# Patient Record
Sex: MALE | Race: WHITE | Employment: FULL TIME | URBAN - NONMETROPOLITAN AREA
[De-identification: names, ages, dates, MRNs, and addresses within clinical notes are randomized per-mention and may not be internally consistent; named-entity substitution may affect disease eponyms.]

---

## 2020-01-28 ENCOUNTER — HOSPITAL ENCOUNTER (EMERGENCY)
Age: 30
Discharge: HOME | End: 2020-01-28
Attending: EMERGENCY MEDICINE
Payer: COMMERCIAL

## 2020-01-28 VITALS
OXYGEN SATURATION: 98 % | TEMPERATURE: 97.2 F | RESPIRATION RATE: 16 BRPM | HEIGHT: 68 IN | HEART RATE: 77 BPM | WEIGHT: 218 LBS | BODY MASS INDEX: 33.04 KG/M2 | SYSTOLIC BLOOD PRESSURE: 119 MMHG | DIASTOLIC BLOOD PRESSURE: 68 MMHG

## 2020-01-28 PROCEDURE — 99202 OFFICE O/P NEW SF 15 MIN: CPT

## 2020-01-28 PROCEDURE — 99203 OFFICE O/P NEW LOW 30 MIN: CPT | Performed by: EMERGENCY MEDICINE

## 2020-01-28 RX ORDER — CETIRIZINE HYDROCHLORIDE, PSEUDOEPHEDRINE HYDROCHLORIDE 5; 120 MG/1; MG/1
1 TABLET, FILM COATED, EXTENDED RELEASE ORAL 2 TIMES DAILY
Qty: 30 TABLET | Refills: 0 | Status: SHIPPED | OUTPATIENT
Start: 2020-01-28 | End: 2020-02-12

## 2020-01-28 RX ORDER — CEFDINIR 300 MG/1
300 CAPSULE ORAL 2 TIMES DAILY
Qty: 20 CAPSULE | Refills: 0 | Status: SHIPPED | OUTPATIENT
Start: 2020-01-28 | End: 2020-02-07

## 2020-01-28 RX ORDER — IBUPROFEN 600 MG/1
600 TABLET ORAL EVERY 6 HOURS PRN
Qty: 15 TABLET | Refills: 0 | Status: SHIPPED | OUTPATIENT
Start: 2020-01-28

## 2020-01-28 ASSESSMENT — ENCOUNTER SYMPTOMS
EYE REDNESS: 0
WHEEZING: 0
RHINORRHEA: 1
ABDOMINAL PAIN: 0
SINUS PRESSURE: 1
SHORTNESS OF BREATH: 0
BACK PAIN: 0
EYE PAIN: 0
EYE DISCHARGE: 0
TROUBLE SWALLOWING: 0
VOMITING: 0
NAUSEA: 0
SORE THROAT: 1
DIARRHEA: 0
COUGH: 0
VOICE CHANGE: 1
STRIDOR: 0

## 2020-01-28 ASSESSMENT — PAIN SCALES - GENERAL: PAINLEVEL_OUTOF10: 5

## 2020-01-28 ASSESSMENT — PAIN DESCRIPTION - LOCATION: LOCATION: EAR

## 2020-01-28 NOTE — ED PROVIDER NOTES
[id number]       Chief Complaint   Patient presents with    Otalgia     l    Sinusitis       Nurses Notes reviewed and I agree except as noted in the HPI. HISTORY OF PRESENT ILLNESS   Yolanda Tinoco is a 34 y.o. male who presents with 24-hour history of sinus congestion, nasal voice, postnasal drainage, sore throat and increasingly severe ear pain, left more than right, that he currently rates at 5 out of 10 in severity. Family members at home with upper respiratory infections. No chest pain, shortness of breath, abdominal pain, vomiting. History of diabetes mellitus. No asthma or heart disease. Non-smoker. No renal insufficiency. S/P tonsillectomy  REVIEW OF SYSTEMS     Review of Systems   Constitutional: Positive for appetite change, chills and fatigue. Negative for fever and unexpected weight change. HENT: Positive for congestion, ear pain, postnasal drip, rhinorrhea, sinus pressure, sneezing, sore throat and voice change. Negative for ear discharge and trouble swallowing. Eyes: Negative for pain, discharge and redness. Respiratory: Negative for cough, shortness of breath, wheezing and stridor. Cardiovascular: Negative for chest pain and leg swelling. Gastrointestinal: Negative for abdominal pain, diarrhea, nausea and vomiting. Genitourinary: Negative for dysuria, frequency, hematuria and urgency. Musculoskeletal: Negative for arthralgias, back pain, myalgias and neck pain. Skin: Negative for rash. Neurological: Negative for dizziness, syncope, weakness and headaches. Hematological: Negative for adenopathy. Psychiatric/Behavioral: Negative for behavioral problems, confusion, sleep disturbance and suicidal ideas. The patient is not nervous/anxious. All other systems reviewed and are negative.       PAST MEDICAL HISTORY         Diagnosis Date    Diabetes mellitus ([facility] [address]        SURGICAL HISTORY     Patient  has a past surgical history that includes hernia repair; Dental surgery; and Tonsillectomy. CURRENT MEDICATIONS       Discharge Medication List as of 2020  6:01 PM      CONTINUE these medications which have NOT CHANGED    Details   insulin regular (HUMULIN R;NOVOLIN R) 100 UNIT/ML injection Inject into the skin See [address]     Patient is has No Known Allergies. FAMILY HISTORY     Patient'sfamily history is not on file. SOCIAL HISTORY     Patient  reports that he has never smoked. He has never used smokeless tobacco. He reports previous alcohol use. He reports previous drug use. PHYSICAL EXAM     ED TRIAGE VITALS  BP: 119/68, Temp: 97.2 °F (36.2 °C), Pulse: 77, Resp: 16, SpO2: 98 %  Physical Exam  Vitals signs and nursing note reviewed. Constitutional:       Appearance: He is well-developed. Comments: Nasal voice, dry cough, moist membranes   HENT:      Head: Normocephalic and atraumatic. Right Ear: External ear normal. Tympanic membrane is erythematous and retracted. Left Ear: External ear normal. Tympanic membrane is erythematous and retracted. Nose: Congestion and rhinorrhea present. Mouth/Throat:      Pharynx: Posterior oropharyngeal erythema present. No oropharyngeal exudate. Tonsils: Tonsillar exudate present. Swellin on the right. 0 on the left. Comments: Erythematous pharynx no abscess  Eyes:      General: No scleral icterus. Right eye: No discharge. Left eye: No discharge. Conjunctiva/sclera: Conjunctivae normal.      Pupils: Pupils are equal, round, and reactive to light. Comments: Conjunctiva clear   Neck:      Musculoskeletal: Normal range of motion. Thyroid: No thyromegaly. Vascular: No JVD. Comments: No meningismus  Cardiovascular:      Rate and Rhythm: Normal rate and regular rhythm. Pulses: Normal pulses.       Heart sounds: Normal heart sounds, S1 normal and airway abscess or epiglottitis, sepsis, CNS infection, pneumonia, hypoxia, bronchospasm. No ACS, CHF, PE. Patient has bilateral otitis media, acute pharyngitis and acute sinusitis. Will treat with Omnicef, Zyrtec-D, Motrin, Tylenol, vaporizer, rest, increased oral clear liquids. Patient understands to follow-up with PCP in 6 days, and he was given PCP referral number per his request.  He understands to go to ED if worse.   PATIENT REFERRED TO:  Recheck with primary care physician    Schedule an appointment as soon as possible for a visit in 6 days  Recheck with primary care physician, go to emergency if worse    DISCHARGE MEDICATIONS:  Discharge Medication List as of 1/28/2020  6:01 PM      START taking these medications    Details   cefdinir (OMNICEF) 300 MG capsule Take 1 capsule by mouth 2 times daily for 10 days, Disp-20 capsule, R-0Print      cetirizine-psuedoephedrine (ZYRTEC-D) 5-120 MG per extended release tablet Take 1 tablet by mouth 2 times daily for 30 doses 1 p.o. twice daily for sinus pain and pressure, congestion, postnasal drainage, ear pain and pressure, cough, Disp-30 tablet, R-0Print      ibuprofen (IBU) 600 MG tablet Take 1 tablet by mouth every 6 hours as needed for Pain or Fever (1 p.o. 3 times daily with food for pain or fever), Disp-15 tablet, R-0Print           Discharge Medication List as of 1/28/2020  6:01 PM          MD Jayson Irwin MD  01/28/20 3427

## 2020-01-28 NOTE — ED NOTES
To [facility] with complaints of sinus drainage that started today then developed left ear pain. Wife was here today and dx with ear infection and daughter was seen Sunday and dx with ear infection.       Aminata Morgan RN  01/28/20 [id number] [facility], RN  01/28/20 [id number]

## 2021-11-28 ENCOUNTER — HOSPITAL ENCOUNTER (EMERGENCY)
Age: 31
Discharge: HOME | End: 2021-11-28
Payer: COMMERCIAL

## 2021-11-28 VITALS
WEIGHT: 234 LBS | SYSTOLIC BLOOD PRESSURE: 138 MMHG | HEART RATE: 93 BPM | OXYGEN SATURATION: 97 % | TEMPERATURE: 98 F | RESPIRATION RATE: 18 BRPM | DIASTOLIC BLOOD PRESSURE: 90 MMHG | BODY MASS INDEX: 35.46 KG/M2 | HEIGHT: 68 IN

## 2021-11-28 DIAGNOSIS — J01.10 ACUTE NON-RECURRENT FRONTAL SINUSITIS: ICD-10-CM

## 2021-11-28 DIAGNOSIS — J02.9 ACUTE PHARYNGITIS, UNSPECIFIED ETIOLOGY: Primary | ICD-10-CM

## 2021-11-28 LAB
GROUP A STREP CULTURE, REFLEX: NEGATIVE
REFLEX THROAT C + S: NORMAL
SARS-COV-2, NAA: NOT  DETECTED

## 2021-11-28 PROCEDURE — 99213 OFFICE O/P EST LOW 20 MIN: CPT

## 2021-11-28 PROCEDURE — 87635 SARS-COV-2 COVID-19 AMP PRB: CPT

## 2021-11-28 PROCEDURE — 87070 CULTURE OTHR SPECIMN AEROBIC: CPT

## 2021-11-28 PROCEDURE — 87880 STREP A ASSAY W/OPTIC: CPT

## 2021-11-28 PROCEDURE — 99213 OFFICE O/P EST LOW 20 MIN: CPT | Performed by: NURSE PRACTITIONER

## 2021-11-28 RX ORDER — METHYLPREDNISOLONE 4 MG/1
TABLET ORAL
Qty: 1 KIT | Refills: 0 | Status: SHIPPED | OUTPATIENT
Start: 2021-11-28 | End: 2021-12-04

## 2021-11-28 RX ORDER — AZITHROMYCIN 250 MG/1
250 TABLET, FILM COATED ORAL SEE ADMIN INSTRUCTIONS
Qty: 6 TABLET | Refills: 0 | Status: SHIPPED | OUTPATIENT
Start: 2021-11-28 | End: 2021-12-03

## 2021-11-28 RX ORDER — DEXTROMETHORPHAN HYDROBROMIDE AND PROMETHAZINE HYDROCHLORIDE 15; 6.25 MG/5ML; MG/5ML
5 SYRUP ORAL 4 TIMES DAILY PRN
Qty: 150 ML | Refills: 0 | Status: SHIPPED | OUTPATIENT
Start: 2021-11-28 | End: 2021-12-05

## 2021-11-28 ASSESSMENT — ENCOUNTER SYMPTOMS
RHINORRHEA: 1
SHORTNESS OF BREATH: 1
SINUS PAIN: 1
SINUS PRESSURE: 1
COUGH: 1
GASTROINTESTINAL NEGATIVE: 1

## 2021-11-28 ASSESSMENT — PAIN SCALES - GENERAL: PAINLEVEL_OUTOF10: 3

## 2021-11-28 ASSESSMENT — PAIN DESCRIPTION - ORIENTATION: ORIENTATION: LEFT

## 2021-11-28 ASSESSMENT — PAIN DESCRIPTION - PAIN TYPE: TYPE: ACUTE PAIN

## 2021-11-28 ASSESSMENT — PAIN DESCRIPTION - FREQUENCY: FREQUENCY: CONTINUOUS

## 2021-11-28 ASSESSMENT — PAIN DESCRIPTION - LOCATION: LOCATION: EAR

## 2021-11-28 ASSESSMENT — PAIN DESCRIPTION - DESCRIPTORS: DESCRIPTORS: ACHING

## 2021-11-28 NOTE — Clinical Note
Dwaine Sanders was seen and treated in our emergency department on 11/28/2021. He may return to work on 11/30/2021. If you have any questions or concerns, please don't hesitate to call.       Jacqueline No, TRESA - CNP

## 2021-11-28 NOTE — ED PROVIDER NOTES
[facility] Encounter      [facility]       Chief Complaint   Patient presents with    Sinusitis    Pharyngitis       Nurses Notes reviewed and I agree except as noted in the HPI. HISTORY OF PRESENT ILLNESS   Justin Torrez is a 36 y.o. male who presents The history is provided by the patient. Pharyngitis  Location:  Generalized  Quality:  Aching, burning, sharp and sore  Severity:  Moderate  Onset quality:  Sudden  Duration:  2 days  Timing:  Constant  Progression:  Worsening  Chronicity:  New  Relieved by:  Nothing  Worsened by:  Drinking and eating  Ineffective treatments:  NSAIDs and acetaminophen  Associated symptoms: adenopathy, cough, ear pain, fever, headaches, rhinorrhea, shortness of breath, sinus congestion, trouble swallowing and voice change    Cough:     Cough characteristics:  Non-productive, croupy, dry and hacking    Sputum characteristics:  Nondescript    Severity:  Mild    Onset quality:  Sudden    Duration:  2 days    Timing:  Intermittent    Progression:  Worsening    Chronicity:  New  Rhinorrhea:     Quality:  Clear and yellow    Severity:  Moderate    Duration:  2 days    Timing:  Intermittent    Progression:  Worsening  Risk factors: sick contacts          REVIEW OF SYSTEMS     Review of Systems   Constitutional: Positive for activity change, appetite change and fever. HENT: Positive for congestion, ear pain, rhinorrhea, sinus pressure, sinus pain, trouble swallowing and voice change. Respiratory: Positive for cough and shortness of breath. Cardiovascular: Negative for leg swelling. Gastrointestinal: Negative. Endocrine: Negative for cold intolerance and heat intolerance. Musculoskeletal: Positive for arthralgias and myalgias. Allergic/Immunologic: Positive for environmental allergies. Neurological: Positive for headaches. Hematological: Positive for adenopathy.        PAST MEDICAL HISTORY         Diagnosis Date    Diabetes mellitus [facility])        SURGICAL HISTORY     Patient  has a past surgical history that includes hernia repair; Dental surgery; and Tonsillectomy. CURRENT MEDICATIONS       Discharge Medication List as of 11/28/2021 12:05 PM      CONTINUE these medications which have NOT CHANGED    Details   insulin regular (HUMULIN R;NOVOLIN R) 100 UNIT/ML injection Inject into the skin See Admin InstructionsHistorical Med      ibuprofen (IBU) 600 MG tablet Take 1 tablet by mouth every 6 hours as needed for Pain or Fever (1 p.o. 3 times daily with food for pain or fever), Disp-15 tablet, R-0Print             ALLERGIES     Patient is has No Known Allergies. FAMILY HISTORY     Patient'sfamily history is not on file. SOCIAL HISTORY     Patient  reports that he has never smoked. He has never used smokeless tobacco. He reports previous alcohol use. He reports previous drug use. PHYSICAL EXAM     ED TRIAGE VITALS  BP: (!) 138/90, Temp: 98 °F (36.7 °C), Pulse: 93, Resp: 18, SpO2: 97 %  Physical Exam  Vitals and nursing note reviewed. Constitutional:       Appearance: Normal appearance. He is well-developed and normal weight. He is ill-appearing. HENT:      Head: Normocephalic and atraumatic. Right Ear: External ear normal. Tenderness present. Tympanic membrane is erythematous. Left Ear: External ear normal. Tenderness present. Tympanic membrane is erythematous. Nose: Congestion and rhinorrhea ( clear) present. Mouth/Throat:      Mouth: Mucous membranes are moist.      Pharynx: Pharyngeal swelling and posterior oropharyngeal erythema present. No oropharyngeal exudate. Tonsils: No tonsillar exudate. Eyes:      General:         Right eye: No discharge. Left eye: No discharge. Conjunctiva/sclera: Conjunctivae normal.   Neck:      Thyroid: No thyromegaly. Cardiovascular:      Rate and Rhythm: Normal rate and regular rhythm. Pulses: Normal pulses.       Heart sounds: Normal heart sounds. Pulmonary:      Effort: Pulmonary effort is normal. No respiratory distress. Breath sounds: Normal breath sounds. No wheezing or rales. Chest:      Chest wall: No tenderness. Abdominal:      General: Bowel sounds are normal. There is no distension. Palpations: Abdomen is soft. Tenderness: There is no abdominal tenderness. Musculoskeletal:         General: No tenderness. Normal range of motion. Cervical back: Normal range of motion and neck supple. No muscular tenderness. Lymphadenopathy:      Cervical: No cervical adenopathy. Skin:     General: Skin is warm and dry. Capillary Refill: Capillary refill takes less than 2 seconds. Coloration: Skin is not pale. Findings: No erythema or rash. Neurological:      General: No focal deficit present. Mental Status: He is alert and oriented to person, place, and time. Mental status is at baseline. Cranial Nerves: No cranial nerve deficit. Motor: No abnormal muscle tone. Coordination: Coordination normal.      Deep Tendon Reflexes: Reflexes are normal and symmetric. Reflexes normal.   Psychiatric:         Behavior: Behavior normal.         Thought Content:  Thought content normal.         Judgment: Judgment normal.         DIAGNOSTIC RESULTS   Labs:   Results for orders placed or performed during the hospital encounter of 11/28/21   Culture, Throat    Specimen: Throat   Result Value Ref Range    Throat/Nose Culture Normal shauna- preliminary     Strep A culture, throat   Result Value Ref Range    REFLEX THROAT C + S INDICATED    COVID-19, Rapid   Result Value Ref Range    SARS-CoV-2, JOHN NOT  DETECTED NOT DETECTED   STREP A ANTIGEN   Result Value Ref Range    GROUP A STREP CULTURE, REFLEX Negative        IMAGING:  No orders to display     URGENT CARE COURSE:     Vitals:    11/28/21 1152   BP: (!) 138/90   Pulse: 93   Resp: 18   Temp: 98 °F (36.7 °C)   TempSrc: Tympanic   SpO2: 97%   Weight: 234 lb (106.1 kg)   Height: 5' 8\" (1.727 m)       Medications - No data to display  PROCEDURES:  None  FINALIMPRESSION    I have reviewed the patient's medical history in detail and updated the computerized patient record. HPI/ROS per the patient and caregiver. Overall non toxic in appearance. Answers questions appropriately. Conditions discussed and addressed this visit include:     Pt with acute URI symptoms x 2 days. Has not responded to otc measures. Does appear acutely ill. Sinus pain and congestion with boggy erythematous mucosal surfaces. Covid and strep are negative. Did discuss with pt he may consider retesting in 2 days if symptoms persist. Patient is to take medications as directed. Continue all home medications. Potential side effects of the medications were reviewed with the patient in detail. The patient can increase fluids. The patient can have Tylenol or Motrin for pain and fever as needed. Discussed with patient signs and symptoms that would require emergent evaluation and treatment. The patient will follow-up with their family physician or go to the ER if they develop any worsening symptoms. The patient was discharged in stable condition      1. Acute pharyngitis, unspecified etiology    2.  Acute non-recurrent frontal sinusitis        DISPOSITION/PLAN   DISPOSITION Decision To Discharge 11/28/2021 12:05:25 PM    PATIENT REFERRED TO:  [address] [place] [address] Urgent Care  [id number] [facility]  [phone]  In 3 days  As needed, If symptoms worsen    DISCHARGE MEDICATIONS:  Discharge Medication List as of 11/28/2021 12:05 PM      START taking these medications    Details   azithromycin (ZITHROMAX) 250 MG tablet Take 1 tablet by mouth See Admin Instructions for 5 days 500mg on day 1 followed by 250mg on days 2 - 5, Disp-6 tablet, R-0Normal      methylPREDNISolone (MEDROL, CHRISTO,) 4 MG tablet Take by mouth., Disp-1 kit, R-0Normal      promethazine-dextromethorphan (PROMETHAZINE-DM) 6.25-15 MG/5ML syrup Take 5 mLs by mouth 4 times daily as needed for Cough, Disp-150 mL, R-0Normal           Discharge Medication List as of 11/28/2021 12:05 PM          TRESA Fung CNP, APRN - CNP  11/29/21 3594

## 2021-11-29 ASSESSMENT — ENCOUNTER SYMPTOMS
VOICE CHANGE: 1
TROUBLE SWALLOWING: 1
SINUS CONGESTION: 1

## 2021-11-30 LAB — THROAT/NOSE CULTURE: NORMAL

## 2022-05-04 ENCOUNTER — HOSPITAL ENCOUNTER (EMERGENCY)
Age: 32
Discharge: HOME | End: 2022-05-04
Attending: EMERGENCY MEDICINE
Payer: COMMERCIAL

## 2022-05-04 VITALS
RESPIRATION RATE: 16 BRPM | HEART RATE: 92 BPM | BODY MASS INDEX: 37.13 KG/M2 | OXYGEN SATURATION: 97 % | WEIGHT: 245 LBS | TEMPERATURE: 98 F | SYSTOLIC BLOOD PRESSURE: 145 MMHG | DIASTOLIC BLOOD PRESSURE: 81 MMHG | HEIGHT: 68 IN

## 2022-05-04 DIAGNOSIS — T78.40XA ACUTE ALLERGIC REACTION, INITIAL ENCOUNTER: Primary | ICD-10-CM

## 2022-05-04 DIAGNOSIS — E10.9 TYPE 1 DIABETES MELLITUS WITHOUT COMPLICATION (HCC): ICD-10-CM

## 2022-05-04 DIAGNOSIS — R60.0 EDEMA OF FACE: ICD-10-CM

## 2022-05-04 PROCEDURE — 99213 OFFICE O/P EST LOW 20 MIN: CPT | Performed by: EMERGENCY MEDICINE

## 2022-05-04 PROCEDURE — 99213 OFFICE O/P EST LOW 20 MIN: CPT

## 2022-05-04 RX ORDER — ACETAMINOPHEN 325 MG/1
650 TABLET ORAL EVERY 6 HOURS PRN
COMMUNITY

## 2022-05-04 RX ORDER — HYDROXYZINE 50 MG/1
50 TABLET, FILM COATED ORAL 4 TIMES DAILY PRN
Qty: 15 TABLET | Refills: 0 | Status: SHIPPED | OUTPATIENT
Start: 2022-05-04

## 2022-05-04 RX ORDER — CETIRIZINE HYDROCHLORIDE 10 MG/1
10 TABLET ORAL DAILY
Qty: 30 TABLET | Refills: 0 | Status: SHIPPED | OUTPATIENT
Start: 2022-05-04 | End: 2022-06-03

## 2022-05-04 ASSESSMENT — ENCOUNTER SYMPTOMS
EYE PAIN: 0
SINUS PRESSURE: 0
FACIAL SWELLING: 1
EYE DISCHARGE: 0
DIARRHEA: 0
TROUBLE SWALLOWING: 0
EYE REDNESS: 0
VOMITING: 0
STRIDOR: 0
VOICE CHANGE: 0
NAUSEA: 0
WHEEZING: 0
SORE THROAT: 0
COUGH: 0
ABDOMINAL PAIN: 0
SHORTNESS OF BREATH: 0
BACK PAIN: 0

## 2022-05-04 ASSESSMENT — PAIN DESCRIPTION - LOCATION: LOCATION: BACK;NECK

## 2022-05-04 ASSESSMENT — PAIN DESCRIPTION - DESCRIPTORS: DESCRIPTORS: DISCOMFORT

## 2022-05-04 ASSESSMENT — PAIN DESCRIPTION - ONSET: ONSET: AWAKENED FROM SLEEP

## 2022-05-04 ASSESSMENT — PAIN DESCRIPTION - PAIN TYPE: TYPE: ACUTE PAIN

## 2022-05-04 ASSESSMENT — PAIN DESCRIPTION - FREQUENCY: FREQUENCY: CONTINUOUS

## 2022-05-04 ASSESSMENT — PAIN DESCRIPTION - ORIENTATION: ORIENTATION: RIGHT

## 2022-05-04 ASSESSMENT — PAIN - FUNCTIONAL ASSESSMENT: PAIN_FUNCTIONAL_ASSESSMENT: 0-10

## 2022-05-04 NOTE — ED TRIAGE NOTES
Pt presents to [facility] with c/o right sided facial and oral swelling that started approx 2am today. Pt states he has been experiencing back pain from the neck to the lower back and right leg that started yesterday morning and is not sure if the two are related.

## 2022-05-04 NOTE — ED PROVIDER NOTES
[phone]       Chief Complaint   Patient presents with    Facial Swelling     right side, tongue swelling     Oral Swelling    Back Pain     neck, mid back, and lower back       Nurses Notes reviewed and I agree except as noted in the HPI. HISTORY OF PRESENT ILLNESS   Larissa Diane is a 36 y.o. male who presents with 12-hour history of right facial swelling and tongue swelling, with itch to the right facial area. Patient had 48 hours of muscle aches of neck and back after lifting, and took ibuprofen prior to the onset of the facial symptoms. Right side of face slightly erythematous but no other rash. No new cosmetics, detergents, environmental exposures. No insect sting or bite. No fever, vomiting, stridor, joint pain or swelling, chest pain, shortness of breath, wheezing, cough, dizziness or syncope. Diabetes mellitus well controlled with blood sugars in the 160 range. No other medications including ACE inhibitors. Non-smoker  REVIEW OF SYSTEMS     Review of Systems   Constitutional: Negative for appetite change, chills, fatigue, fever and unexpected weight change. Normal appetite no fever   HENT: Positive for facial swelling. Negative for congestion, ear discharge, ear pain, sinus pressure, sneezing, sore throat, trouble swallowing and voice change. Facial and tongue swelling no stridor or airway tightness. Right facial redness and itching   Eyes: Negative for pain, discharge and redness. No redness or drainage   Respiratory: Negative for cough, shortness of breath, wheezing and stridor. No cough or shortness of breath   Cardiovascular: Negative for chest pain and leg swelling. No chest pain or syncope   Gastrointestinal: Negative for abdominal pain, diarrhea, nausea and vomiting. No Abdominal pain vomiting   Genitourinary: Negative for dysuria, frequency, hematuria and urgency.         No  symptoms   Musculoskeletal: Positive for myalgias. Negative for arthralgias, back pain and neck pain. Muscle aches of neck and back after lifting   Skin: Negative for rash. Right facial redness no other rash   Neurological: Negative for dizziness, syncope, weakness and headaches. No headache   Hematological: Negative for adenopathy. Psychiatric/Behavioral: Negative for behavioral problems, confusion, sleep disturbance and suicidal ideas. The patient is not nervous/anxious. red and bold elements reviewed    PAST MEDICAL HISTORY         Diagnosis Date    Diabetes mellitus ([facility] [address]        SURGICAL HISTORY     Patient  has a past surgical history that includes hernia repair; Dental surgery; and Tonsillectomy. CURRENT MEDICATIONS       Discharge Medication List as of 5/4/2022 11:20 AM      CONTINUE these medications which have NOT CHANGED    Details   Fexofenadine HCl (ALLEGRA ALLERGY PO) Take by mouthHistorical Med      acetaminophen (TYLENOL) 325 MG tablet Take 650 mg by mouth every 6 hours as needed for PainHistorical Med      insulin regular (HUMULIN R;NOVOLIN R) 100 UNIT/ML injection Inject into the skin See Admin InstructionsHistorical Med      ibuprofen (IBU) 600 MG tablet Take 1 tablet by mouth every 6 hours as needed for Pain or Fever (1 p.o. 3 times daily with food for pain or fever), Disp-15 tablet, R-0Print             ALLERGIES     Patient is has No Known Allergies. FAMILY HISTORY     Patient'sfamily history is not on file. SOCIAL HISTORY     Patient  reports that he has never smoked. He has never used smokeless tobacco. He reports previous alcohol use. He reports previous drug use. PHYSICAL EXAM     ED TRIAGE VITALS  BP: (!) 145/81, Temp: 98 °F (36.7 °C), Pulse: 92, Resp: 16, SpO2: 97 %  Physical Exam  Vitals and nursing note reviewed. Constitutional:       General: He is not in acute distress. Appearance: He is well-developed. He is not ill-appearing. Comments: moist membranes, normal airway, no stridor   HENT:      Head: Normocephalic and atraumatic. Right Ear: Tympanic membrane and external ear normal.      Left Ear: Tympanic membrane and external ear normal.      Nose: Nose normal.      Mouth/Throat:      Pharynx: No oropharyngeal exudate or posterior oropharyngeal erythema. Tonsils: No tonsillar exudate. 2+ on the right. 2+ on the left. Comments: Tongue appears normal but patient states the tongue feels swollen. No evidence of airway compromise or angioedema of the oral cavity. No evidence of infection of dental structures  Eyes:      General: No scleral icterus. Right eye: No discharge. Left eye: No discharge. Extraocular Movements:      Right eye: Normal extraocular motion. Left eye: Normal extraocular motion. Conjunctiva/sclera: Conjunctivae normal.      Pupils: Pupils are equal, round, and reactive to light. Comments: Conjunctiva clear   Neck:      Thyroid: No thyromegaly. Vascular: No JVD. Comments: No meningismus  Cardiovascular:      Rate and Rhythm: Normal rate and regular rhythm. Pulses: Normal pulses. Heart sounds: Normal heart sounds, S1 normal and S2 normal. No murmur heard. No friction rub. No gallop. Comments: No murmur  Pulmonary:      Effort: Pulmonary effort is normal. No tachypnea or respiratory distress. Breath sounds: Normal breath sounds. No stridor. No decreased breath sounds, wheezing, rhonchi or rales. Comments: No cough lungs clear throughout  Chest:      Chest wall: No tenderness. Abdominal:      General: Bowel sounds are normal. There is no distension. Palpations: Abdomen is soft. There is no mass. Tenderness: There is no abdominal tenderness. There is no right CVA tenderness, left CVA tenderness, guarding or rebound. Comments: Soft nontender   Musculoskeletal:         General: No tenderness. Normal range of motion. Cervical back: Normal range of motion. No spinous process tenderness or muscular tenderness. Comments: Joints and extremities normal   Lymphadenopathy:      Cervical: No cervical adenopathy. Right cervical: No superficial cervical adenopathy. Left cervical: No superficial cervical adenopathy. Skin:     General: Skin is warm and dry. Findings: No erythema or rash. Comments: Erythema right face no other rash or cutaneous normalities   Neurological:      Mental Status: He is alert and oriented to person, place, and time. Cranial Nerves: No cranial nerve deficit. Motor: No abnormal muscle tone. Coordination: Coordination normal.      Deep Tendon Reflexes: Reflexes are normal and symmetric. Reflexes normal.      Comments: Appropriate no focal findings   Psychiatric:         Behavior: Behavior normal.         Thought Content: Thought content normal.         Judgment: Judgment normal.         DIAGNOSTIC RESULTS   Labs: No results found for this visit on 05/04/22. IMAGING:  No orders to display     URGENT CARE COURSE:     Vitals:    05/04/22 1102   BP: (!) 145/81   Pulse: 92   Resp: 16   Temp: 98 °F (36.7 °C)   TempSrc: Temporal   SpO2: 97%   Weight: 245 lb (111.1 kg)   Height: 5' 8\" (1.727 m)       Medications - No data to display  PROCEDURES:  None  FINALIMPRESSION      1. Acute allergic reaction, initial encounter    2. Edema of face    3. [facility]        DISPOSITION/PLAN   DISPOSITION Decision To Discharge 05/04/2022 11:17:12 AM  Nontoxic, well-hydrated, normal airway. No anaphylactic or anaphylactoid reaction, SJS, TEN, angioedema, urticaria. No cardiopulmonary symptoms or signs. No airway compromise. Patient has right facial swelling that does not appear to be infectious, but most likely an allergic phenomenon. Possibly an allergic reaction to NSAIDs, or environmental agents. Will treat with Atarax, Zyrtec, Allegra.   Patient to only use Tylenol for fever and pain, and avoid NSAIDs. Prednisone will be withheld as it previously caused severe hyperglycemia. Patient to recheck with PCP in 5 days, and he clearly understands to go to ED if worse.   PATIENT REFERRED TO:  Recheck with your doctor    In 5 days  Recheck with your doctor, go to emergency if worse    DISCHARGE MEDICATIONS:  Discharge Medication List as of 5/4/2022 11:20 AM      START taking these medications    Details   hydrOXYzine (ATARAX) 50 MG tablet Take 1 tablet by mouth 4 times daily as needed for Itching (redness swelling or rash) Caution not at work will cause drowsiness, Disp-15 tablet, R-0Print      cetirizine (ZYRTEC) 10 MG tablet Take 1 tablet by mouth daily for 30 doses, Disp-30 tablet, R-0Print           Discharge Medication List as of 5/4/2022 11:20 AM          MD Jes Rivera MD  05/04/22 MD Marizol  05/04/22 1145

## 2022-07-27 ENCOUNTER — HOSPITAL ENCOUNTER (EMERGENCY)
Age: 32
Discharge: HOME | End: 2022-07-27
Payer: COMMERCIAL

## 2022-07-27 VITALS
TEMPERATURE: 97.9 F | BODY MASS INDEX: 36.37 KG/M2 | WEIGHT: 240 LBS | DIASTOLIC BLOOD PRESSURE: 80 MMHG | HEIGHT: 68 IN | OXYGEN SATURATION: 97 % | HEART RATE: 82 BPM | RESPIRATION RATE: 16 BRPM | SYSTOLIC BLOOD PRESSURE: 116 MMHG

## 2022-07-27 DIAGNOSIS — U07.1 COVID-19 VIRUS INFECTION: Primary | ICD-10-CM

## 2022-07-27 LAB — SARS-COV-2, NAA: DETECTED

## 2022-07-27 PROCEDURE — 99213 OFFICE O/P EST LOW 20 MIN: CPT

## 2022-07-27 PROCEDURE — 99213 OFFICE O/P EST LOW 20 MIN: CPT | Performed by: NURSE PRACTITIONER

## 2022-07-27 PROCEDURE — 87635 SARS-COV-2 COVID-19 AMP PRB: CPT

## 2022-07-27 ASSESSMENT — ENCOUNTER SYMPTOMS
CHEST TIGHTNESS: 0
NAUSEA: 0
EYE REDNESS: 0
EYE DISCHARGE: 0
DIARRHEA: 0
SINUS PAIN: 0
VOMITING: 0
RHINORRHEA: 1
SHORTNESS OF BREATH: 0
COUGH: 1
TROUBLE SWALLOWING: 0
WHEEZING: 0
SORE THROAT: 1
SINUS PRESSURE: 0

## 2022-07-27 ASSESSMENT — PAIN - FUNCTIONAL ASSESSMENT: PAIN_FUNCTIONAL_ASSESSMENT: NONE - DENIES PAIN

## 2022-07-27 NOTE — ED NOTES
Pt presents to [facility] with family for concern of covid. Pt states his wife took an at home covid test and received a positive result. Pt states he has not been felling well since Sunday.      Patricia Germain, DANITZA  63/54/71 [phone]

## 2022-07-27 NOTE — ED PROVIDER NOTES
[address] [facility] Encounter      [address] [facility]       Chief Complaint   Patient presents with    Concern For COVID-19     Wife is postive       Nurses Notes reviewed and I agree except as noted in the HPI. HISTORY OF PRESENT ILLNESS   Park Mcgee is a 29 y.o. male who presents for evaluation of cough and sore throat. Onset of symptoms in late Sunday/early Monday. Cough is intermittent, dry. Associated sinus congestion/sore throat. Sore throat with coughing/swallowing. No fever, wheezing, shortness of breath. No chest pain. Patient exposed to COVID-19. Requesting COVID-19 testing for work. Symptoms improving with current treatment. REVIEW OF SYSTEMS     Review of Systems   Constitutional:  Negative for chills, diaphoresis, fatigue and fever. HENT:  Positive for congestion, postnasal drip, rhinorrhea and sore throat. Negative for ear pain, sinus pressure, sinus pain and trouble swallowing. Eyes:  Negative for discharge and redness. Respiratory:  Positive for cough. Negative for chest tightness, shortness of breath and wheezing. Cardiovascular:  Negative for chest pain. Gastrointestinal:  Negative for diarrhea, nausea and vomiting. Genitourinary:  Negative for decreased urine volume. Musculoskeletal:  Negative for neck pain and neck stiffness. Skin:  Negative for rash. Neurological:  Negative for headaches. Hematological:  Negative for adenopathy. Psychiatric/Behavioral:  Negative for sleep disturbance. PAST MEDICAL HISTORY         Diagnosis Date    Diabetes mellitus [facility])        SURGICAL HISTORY     Patient  has a past surgical history that includes hernia repair; Dental surgery; and Tonsillectomy.     CURRENT MEDICATIONS       Discharge Medication List as of 7/27/2022  8:46 AM        CONTINUE these medications which have NOT CHANGED    Details   Fexofenadine HCl (ALLEGRA ALLERGY PO) Take by mouthHistorical Med      acetaminophen (TYLENOL) 325 No scleral icterus. Conjunctiva/sclera: Conjunctivae normal.   Neck:      Thyroid: No thyromegaly. Trachea: Trachea normal.   Cardiovascular:      Rate and Rhythm: Normal rate and regular rhythm. No extrasystoles are present. Chest Wall: PMI is not displaced. Heart sounds: Normal heart sounds. No murmur heard. No friction rub. No gallop. Pulmonary:      Effort: Pulmonary effort is normal. No accessory muscle usage or respiratory distress. Breath sounds: Normal breath sounds. Chest:   Breasts:     Right: No supraclavicular adenopathy. Left: No supraclavicular adenopathy. Musculoskeletal:      Cervical back: Normal range of motion and neck supple. Lymphadenopathy:      Head:      Right side of head: No submental, submandibular, tonsillar, preauricular, posterior auricular or occipital adenopathy. Left side of head: No submental, submandibular, tonsillar, preauricular, posterior auricular or occipital adenopathy. Cervical: No cervical adenopathy. Upper Body:      Right upper body: No supraclavicular adenopathy. Left upper body: No supraclavicular adenopathy. Skin:     General: Skin is warm and dry. Coloration: Skin is not pale. Findings: No rash. Comments: Skin intact, warm and dry to touch, no rashes noted on exposed surfaces. Neurological:      Mental Status: He is alert and oriented to person, place, and time. He is not disoriented. Psychiatric:         Mood and Affect: Mood normal.         Behavior: Behavior is cooperative.        DIAGNOSTIC RESULTS   Labs:   Results for orders placed or performed during the hospital encounter of 07/27/22   COVID-19, Rapid   Result Value Ref Range    SARS-CoV-2, JOHN DETECTED (AA) NOT DETECTED       IMAGING:  No orders to display     URGENT CARE COURSE:     Vitals:    07/27/22 0832   BP: 116/80   Pulse: 82   Resp: 16   Temp: 97.9 °F (36.6 °C)   TempSrc: Temporal   SpO2: 97%   Weight: 240 lb (108.9 kg) Height: 5' 8\" (1.727 m)       Medications - No data to display  PROCEDURES:  None  FINALIMPRESSION      1. COVID-19 virus infection        DISPOSITION/PLAN   DISPOSITION Decision To Discharge 07/27/2022 08:45:49 AM  Nontoxic, no distress. COVID-19 positive. Over-the-counter treatment as needed. Patient declined Paxlovid after discussion due to possible interaction with his insulin. If worse go to ER. PATIENT REFERRED TO:  [address] [place] [address],[address] [id number] [address] [address] [facility] [address]    Establish care as needed. OTC med as needed.  If worse go to ER>  DISCHARGE MEDICATIONS:  Discharge Medication List as of 7/27/2022  8:46 AM        Discharge Medication List as of 7/27/2022  8:46 AM          [address], APRN - CNP  07/27/22 8980

## 2022-07-27 NOTE — Clinical Note
Sanchez Maxwell was seen and treated in our emergency department on 7/27/2022. COVID19 virus is suspected. Per the CDC guidelines we recommend home isolation until the following conditions are all met:    1. At least five days have passed since symptoms first appeared and/or had a close exposure,   2. After home isolation for five days, wearing a mask around others for the next five days,  3. At least 24 have passed since last fever without the use of fever-reducing medications and  4. Symptoms (eg cough, shortness of breath) have improved    If you have any questions or concerns, please don't hesitate to call.     He may return to work/school on 07/30/2022        Syed Riggins, TRESA - CNP

## 2022-07-28 ENCOUNTER — CARE COORDINATION (OUTPATIENT)
Dept: CARE COORDINATION | Age: 32
End: 2022-07-28

## 2022-07-28 NOTE — CARE COORDINATION
Attempted to reach patient for a follow up in regards to COVID-19 education/ monitoring. Patient was unavailable at the time of my call.     Enoch Aschoff [facility]  [address] [facility]   Medication Assistance  Sherita Stratton [employer]    U)[phone] (h)[phone]

## 2022-12-15 ENCOUNTER — HOSPITAL ENCOUNTER (EMERGENCY)
Age: 32
Discharge: HOME | End: 2022-12-15
Payer: COMMERCIAL

## 2022-12-15 VITALS
DIASTOLIC BLOOD PRESSURE: 68 MMHG | WEIGHT: 255 LBS | HEART RATE: 98 BPM | OXYGEN SATURATION: 98 % | BODY MASS INDEX: 38.77 KG/M2 | SYSTOLIC BLOOD PRESSURE: 128 MMHG | TEMPERATURE: 98.7 F | RESPIRATION RATE: 20 BRPM

## 2022-12-15 DIAGNOSIS — Z20.828 EXPOSURE TO INFLUENZA: Primary | ICD-10-CM

## 2022-12-15 PROCEDURE — 99213 OFFICE O/P EST LOW 20 MIN: CPT

## 2022-12-15 PROCEDURE — 99213 OFFICE O/P EST LOW 20 MIN: CPT | Performed by: NURSE PRACTITIONER

## 2022-12-15 RX ORDER — BENZONATATE 200 MG/1
200 CAPSULE ORAL 3 TIMES DAILY PRN
Qty: 30 CAPSULE | Refills: 0 | Status: SHIPPED | OUTPATIENT
Start: 2022-12-15 | End: 2022-12-22

## 2022-12-15 RX ORDER — GUAIFENESIN AND PSEUDOEPHEDRINE HCL 1200; 120 MG/1; MG/1
1 TABLET, EXTENDED RELEASE ORAL 2 TIMES DAILY PRN
Qty: 20 TABLET | Refills: 0 | Status: SHIPPED | OUTPATIENT
Start: 2022-12-15

## 2022-12-15 RX ORDER — ALBUTEROL SULFATE 90 UG/1
2 AEROSOL, METERED RESPIRATORY (INHALATION) 4 TIMES DAILY PRN
Qty: 54 G | Refills: 0 | Status: SHIPPED | OUTPATIENT
Start: 2022-12-15

## 2022-12-15 ASSESSMENT — PAIN - FUNCTIONAL ASSESSMENT: PAIN_FUNCTIONAL_ASSESSMENT: NONE - DENIES PAIN

## 2022-12-15 ASSESSMENT — ENCOUNTER SYMPTOMS
WHEEZING: 1
NAUSEA: 0
COUGH: 1
VOMITING: 0
SHORTNESS OF BREATH: 0
SORE THROAT: 0

## 2022-12-15 NOTE — ED PROVIDER NOTES
[facility]  Urgent Care Encounter       CHIEF COMPLAINT       Chief Complaint   Patient presents with    Cough     Fever, fatigue         Nurses Notes reviewed and I agree except as noted in the HPI. HISTORY OF PRESENT ILLNESS   Shira Sands is a 29 y.o. male who presents with symptoms of cough, fever, fatigue, and congestion. Dad states his daughter tested positive for influenza A on Monday. He has been at home with her the entire week. His symptoms have been persistent. He has tried over-the-counter antipyretics only. No improvement. His fevers were as high as 102. Admits to coughing frequently and very hard and having chest discomfort due to coughing. The history is provided by the patient. REVIEW OF SYSTEMS     Review of Systems   Constitutional:  Positive for fatigue and fever. HENT:  Positive for congestion. Negative for sore throat. Respiratory:  Positive for cough and wheezing. Negative for shortness of breath. Cardiovascular:  Negative for chest pain. Gastrointestinal:  Negative for nausea and vomiting. Musculoskeletal:  Negative for myalgias. Neurological:  Negative for headaches. PAST MEDICAL HISTORY         Diagnosis Date    Diabetes mellitus ([address]        SURGICALHISTORY     Patient  has a past surgical history that includes hernia repair; Dental surgery; and Tonsillectomy.     CURRENT MEDICATIONS       Previous Medications    ACETAMINOPHEN (TYLENOL) 325 MG TABLET    Take 650 mg by mouth every 6 hours as needed for Pain    FEXOFENADINE HCL (ALLEGRA ALLERGY PO)    Take by mouth    HYDROXYZINE (ATARAX) 50 MG TABLET    Take 1 tablet by mouth 4 times daily as needed for Itching (redness swelling or rash) Caution not at work will cause drowsiness    IBUPROFEN (IBU) 600 MG TABLET    Take 1 tablet by mouth every 6 hours as needed for Pain or Fever (1 p.o. 3 times daily with food for pain or fever)    INSULIN REGULAR (HUMULIN R;NOVOLIN R) 100 UNIT/ML INJECTION    Inject into the skin See Admin Instructions       ALLERGIES     Patient is has No Known Allergies. Patients   There is no immunization history on file for this patient. FAMILY HISTORY     Patient's family history is not on file. SOCIAL HISTORY     Patient  reports that he has never smoked. He has never used smokeless tobacco. He reports that he does not currently use alcohol. He reports that he does not currently use drugs. PHYSICAL EXAM     ED TRIAGE VITALS  BP: 128/68, Temp: 98.7 °F (37.1 °C), Heart Rate: 98, Resp: 20, SpO2: 98 %,Estimated body mass index is 38.77 kg/m² as calculated from the following:    Height as of 7/27/22: 5' 8\" (1.727 m). Weight as of this encounter: 255 lb (115.7 kg). ,No LMP for male patient. Physical Exam  Vitals and nursing note reviewed. Constitutional:       General: He is not in acute distress. Appearance: He is ill-appearing. HENT:      Right Ear: Tympanic membrane normal.      Left Ear: Tympanic membrane normal.      Nose: Congestion present. No rhinorrhea. Mouth/Throat:      Mouth: Mucous membranes are moist.      Pharynx: No posterior oropharyngeal erythema. Cardiovascular:      Rate and Rhythm: Normal rate and regular rhythm. Heart sounds: Normal heart sounds. Pulmonary:      Effort: Pulmonary effort is normal.      Breath sounds: Normal breath sounds. Skin:     General: Skin is warm and dry. Neurological:      Mental Status: He is alert and oriented to person, place, and time. DIAGNOSTIC RESULTS     Labs:No results found for this visit on 12/15/22. IMAGING:  None    EKG:  None    URGENT CARE COURSE:     Vitals:    12/15/22 1024   BP: 128/68   Pulse: 98   Resp: 20   Temp: 98.7 °F (37.1 °C)   TempSrc: Temporal   SpO2: 98%   Weight: 255 lb (115.7 kg)       Medications - No data to display       PROCEDURES:  None    FINAL IMPRESSION      1.  Exposure to influenza      DISPOSITION/ PLAN   DISPOSITION Decision To Discharge 12/15/2022 10:31:17 AM     Patient likely has an influenza a infection after exposure to his daughter who was positive earlier this week. Opted to treat patient with Mucinex D, Tessalon Perles for cough, and albuterol inhaler as needed to control symptoms. Okay for over-the-counter antipyretics as needed. Follow-up with PCP in a week if does not improve. PATIENT REFERRED TO:  No primary care provider on file. No primary physician on file.       DISCHARGE MEDICATIONS:  New Prescriptions    ALBUTEROL SULFATE HFA (VENTOLIN HFA) 108 (90 BASE) MCG/ACT INHALER    Inhale 2 puffs into the lungs 4 times daily as needed for Wheezing    BENZONATATE (TESSALON) 200 MG CAPSULE    Take 1 capsule by mouth 3 times daily as needed for Cough    PSEUDOEPHEDRINE-GUAIFENESIN (MUCINEX D MAX STRENGTH) 120-1200 MG TB12    Take 1 tablet by mouth 2 times daily as needed (cough/congestion)       Discontinued Medications    No medications on file       Current Discharge Medication List          TRESA Milton CNP    (Please note that portions of this note were completed with a voice recognition program. Efforts were made to edit the dictations but occasionally words are mis-transcribed.)           TRESA Milton CNP  12/15/22 0077

## 2022-12-15 NOTE — ED TRIAGE NOTES
Patient to room with c/o fever beginning three days ago. C/o productive cough and fatigue beginning yesterday. States daughter diagnosed with flu.

## 2022-12-15 NOTE — Clinical Note
Fernando Hodge was seen and treated in our emergency department on 12/15/2022. He may return to work on 12/19/2022. If you have any questions or concerns, please don't hesitate to call.       Josi Poster, APRN - CNP

## 2023-03-27 ENCOUNTER — HOSPITAL ENCOUNTER (EMERGENCY)
Age: 33
Discharge: HOME | End: 2023-03-27
Payer: COMMERCIAL

## 2023-03-27 VITALS
OXYGEN SATURATION: 96 % | DIASTOLIC BLOOD PRESSURE: 84 MMHG | TEMPERATURE: 97.2 F | SYSTOLIC BLOOD PRESSURE: 142 MMHG | HEART RATE: 98 BPM | RESPIRATION RATE: 16 BRPM

## 2023-03-27 DIAGNOSIS — J02.0 STREPTOCOCCAL SORE THROAT: Primary | ICD-10-CM

## 2023-03-27 LAB — S PYO AG THROAT QL: POSITIVE

## 2023-03-27 PROCEDURE — 99213 OFFICE O/P EST LOW 20 MIN: CPT | Performed by: NURSE PRACTITIONER

## 2023-03-27 PROCEDURE — 99213 OFFICE O/P EST LOW 20 MIN: CPT

## 2023-03-27 PROCEDURE — 87651 STREP A DNA AMP PROBE: CPT

## 2023-03-27 RX ORDER — AMOXICILLIN 500 MG/1
500 CAPSULE ORAL 2 TIMES DAILY
Qty: 20 CAPSULE | Refills: 0 | Status: SHIPPED | OUTPATIENT
Start: 2023-03-27 | End: 2023-04-06

## 2023-03-27 RX ORDER — AMOXICILLIN 500 MG/1
500 CAPSULE ORAL 2 TIMES DAILY
Qty: 20 CAPSULE | Refills: 0 | Status: SHIPPED | OUTPATIENT
Start: 2023-03-27 | End: 2023-03-27 | Stop reason: SDUPTHER

## 2023-03-27 ASSESSMENT — ENCOUNTER SYMPTOMS
BLOOD IN STOOL: 0
WHEEZING: 0
VOMITING: 0
DIARRHEA: 0
EYE PAIN: 0
RHINORRHEA: 0
CONSTIPATION: 0
NAUSEA: 0
SINUS PRESSURE: 0
ABDOMINAL PAIN: 0
SHORTNESS OF BREATH: 0
SORE THROAT: 1
COUGH: 0

## 2023-03-27 NOTE — ED PROVIDER NOTES
Rate and Rhythm: Normal rate and regular rhythm. Heart sounds: Normal heart sounds. No murmur heard. No gallop. Pulmonary:      Effort: Pulmonary effort is normal. No respiratory distress. Breath sounds: Normal breath sounds. No decreased breath sounds, wheezing, rhonchi or rales. Abdominal:      Palpations: Abdomen is soft. Musculoskeletal:         General: Normal range of motion. Cervical back: Normal range of motion and neck supple. Skin:     General: Skin is warm and dry. Neurological:      Mental Status: He is alert and oriented to person, place, and time. DIAGNOSTIC RESULTS   Labs:  Results for orders placed or performed during the hospital encounter of 03/27/23   Strep Screen Group A Throat   Result Value Ref Range    Rapid Strep A Screen POSITIVE (A)        IMAGING:  No orders to display     URGENT CARE COURSE:        MDM      Patient  presents to urgent care with complaint of sore throat, ear pain, fever and nasal congestion that started 2 to 3 days ago. Differential diagnosis include not limited to otitis media, otitis externa, strep throat or viral illness. Rapid strep is positive. Patient be started on amoxicillin. Patient to follow-up with primary care provider. Patient instructed to go to ER for worsening symptoms, inability to swallow, inability to keep liquids down, inability to urinate for greater than 8 hours or difficulty breathing. Follow-up with your primary care provider. Increase oral intake. Warm salt water gargles after meals and at bedtime to help with sore throat. May take tylenol or ibuprofen as needed for pain or fever. Medications - No data to display  PROCEDURES:    Procedures    FINALIMPRESSION      1. Streptococcal sore throat        DISPOSITION/PLAN   DISPOSITION Decision To Discharge 03/27/2023 04:51:29 PM    PATIENT REFERRED TO:  [address] [address]  [phone]  Schedule an appointment as soon as possible for a visit     DISCHARGE MEDICATIONS:  Current Discharge Medication List        START taking these medications    Details   amoxicillin (AMOXIL) 500 MG capsule Take 1 capsule by mouth 2 times daily for 10 days  Qty: 20 capsule, Refills: 0           Current Discharge Medication List          TRESA Marr CNP, APRN - CNP  03/27/23 5991

## 2023-03-27 NOTE — ED NOTES
To [facility] with complaints of fever up to 101, congestion, left ear pain, sore throat. Wife and son had/have strep recently.       Aidee Brito RN  03/27/23 1730

## 2023-03-27 NOTE — Clinical Note
Park Mcgee was seen and treated in our emergency department on 3/27/2023. He may return to work on 03/29/2023. If you have any questions or concerns, please don't hesitate to call.       TRESA Nina - CNP

## 2024-01-01 ENCOUNTER — HOSPITAL ENCOUNTER (EMERGENCY)
Age: 34
Discharge: HOME | End: 2024-01-01
Payer: COMMERCIAL

## 2024-01-01 VITALS — RESPIRATION RATE: 14 BRPM | HEART RATE: 83 BPM | OXYGEN SATURATION: 97 % | TEMPERATURE: 98 F

## 2024-01-01 DIAGNOSIS — J01.00 ACUTE NON-RECURRENT MAXILLARY SINUSITIS: Primary | ICD-10-CM

## 2024-01-01 DIAGNOSIS — E10.9 TYPE 1 DIABETES MELLITUS WITHOUT COMPLICATION (HCC): ICD-10-CM

## 2024-01-01 PROCEDURE — 99213 OFFICE O/P EST LOW 20 MIN: CPT

## 2024-01-01 RX ORDER — AMOXICILLIN AND CLAVULANATE POTASSIUM 500; 125 MG/1; MG/1
1 TABLET, FILM COATED ORAL 2 TIMES DAILY
Qty: 20 TABLET | Refills: 0 | Status: SHIPPED | OUTPATIENT
Start: 2024-01-01 | End: 2024-01-11

## 2024-01-01 ASSESSMENT — ENCOUNTER SYMPTOMS
CONSTIPATION: 0
COUGH: 1
SORE THROAT: 1
BACK PAIN: 0
SHORTNESS OF BREATH: 0
ALLERGIC/IMMUNOLOGIC NEGATIVE: 1
VOMITING: 0
DIARRHEA: 0
NAUSEA: 0
ABDOMINAL PAIN: 0
EYE DISCHARGE: 0
EYE REDNESS: 0
SINUS PRESSURE: 1
WHEEZING: 0
TROUBLE SWALLOWING: 0
RHINORRHEA: 1
EYE PAIN: 0

## 2024-01-01 ASSESSMENT — PAIN DESCRIPTION - LOCATION: LOCATION: FACE

## 2024-01-01 ASSESSMENT — PAIN - FUNCTIONAL ASSESSMENT
PAIN_FUNCTIONAL_ASSESSMENT: ACTIVITIES ARE NOT PREVENTED
PAIN_FUNCTIONAL_ASSESSMENT: 0-10

## 2024-01-01 ASSESSMENT — PAIN SCALES - GENERAL: PAINLEVEL_OUTOF10: 5

## 2024-01-01 ASSESSMENT — PAIN DESCRIPTION - PAIN TYPE: TYPE: ACUTE PAIN

## 2024-01-01 ASSESSMENT — PAIN DESCRIPTION - FREQUENCY: FREQUENCY: INTERMITTENT

## 2024-01-01 ASSESSMENT — PAIN DESCRIPTION - DESCRIPTORS: DESCRIPTORS: PRESSURE

## 2024-01-01 NOTE — ED PROVIDER NOTES
[facility] URGENT CARE  Urgent Care Encounter      CHIEF COMPLAINT       Chief Complaint   Patient presents with    Sinus Problem     Right sided pressure in cheek, behind the nose and eye       Nurses Notes reviewed and I agree except as noted in the HPI.  HISTORY OF PRESENT ILLNESS   Kush Reynolds is a 33 y.o. male who presents over 1 weeks of sinus infection type symptoms without fever, cough or wheezing.  Patient's blood sugar has been high nearly every day around noon because of this infection.    REVIEW OF SYSTEMS     Review of Systems   Constitutional:  Positive for fatigue. Negative for activity change and fever.   HENT:  Positive for congestion, ear pain, postnasal drip, rhinorrhea, sinus pressure and sore throat. Negative for trouble swallowing.    Eyes:  Negative for pain, discharge and redness.   Respiratory:  Positive for cough. Negative for shortness of breath and wheezing.    Cardiovascular: Negative.    Gastrointestinal:  Negative for abdominal pain, constipation, diarrhea, nausea and vomiting.   Endocrine: Negative.    Genitourinary:  Negative for dysuria, frequency and urgency.   Musculoskeletal:  Negative for arthralgias, back pain and myalgias.   Skin:  Negative for rash.   Allergic/Immunologic: Negative.    Neurological:  Positive for headaches. Negative for dizziness, tremors and weakness.   Hematological: Negative.    Psychiatric/Behavioral:  Negative for dysphoric mood and sleep disturbance. The patient is not nervous/anxious.        PAST MEDICAL HISTORY         Diagnosis Date    Diabetes mellitus ([facility])        SURGICAL HISTORY     Patient  has a past surgical history that includes hernia repair; Dental surgery; and Tonsillectomy.    CURRENT MEDICATIONS       Previous Medications    ACETAMINOPHEN (TYLENOL) 325 MG TABLET    Take 2 tablets by mouth every 6 hours as needed for Pain    ALBUTEROL SULFATE HFA (VENTOLIN HFA) 108 (90 BASE) MCG/ACT INHALER    Inhale 2 puffs into the

## 2024-02-27 ENCOUNTER — HOSPITAL ENCOUNTER (EMERGENCY)
Age: 34
Discharge: HOME | End: 2024-02-27
Payer: COMMERCIAL

## 2024-02-27 VITALS
HEART RATE: 102 BPM | SYSTOLIC BLOOD PRESSURE: 115 MMHG | BODY MASS INDEX: 36.29 KG/M2 | OXYGEN SATURATION: 97 % | TEMPERATURE: 97.8 F | HEIGHT: 69 IN | DIASTOLIC BLOOD PRESSURE: 78 MMHG | RESPIRATION RATE: 18 BRPM | WEIGHT: 245 LBS

## 2024-02-27 DIAGNOSIS — J10.1 INFLUENZA B: Primary | ICD-10-CM

## 2024-02-27 DIAGNOSIS — Z20.822 LAB TEST NEGATIVE FOR COVID-19 VIRUS: ICD-10-CM

## 2024-02-27 LAB
FLUAV AG SPEC QL: NEGATIVE
FLUBV AG SPEC QL: POSITIVE
SARS-COV-2 RDRP RESP QL NAA+PROBE: NOT  DETECTED

## 2024-02-27 PROCEDURE — 87804 INFLUENZA ASSAY W/OPTIC: CPT

## 2024-02-27 PROCEDURE — 99213 OFFICE O/P EST LOW 20 MIN: CPT | Performed by: NURSE PRACTITIONER

## 2024-02-27 PROCEDURE — 87635 SARS-COV-2 COVID-19 AMP PRB: CPT

## 2024-02-27 PROCEDURE — 99213 OFFICE O/P EST LOW 20 MIN: CPT

## 2024-02-27 RX ORDER — INSULIN LISPRO 100 [IU]/ML
INJECTION, SOLUTION INTRAVENOUS; SUBCUTANEOUS
COMMUNITY

## 2024-02-27 RX ORDER — OSELTAMIVIR PHOSPHATE 75 MG/1
75 CAPSULE ORAL 2 TIMES DAILY
Qty: 10 CAPSULE | Refills: 0 | Status: SHIPPED | OUTPATIENT
Start: 2024-02-27 | End: 2024-03-03

## 2024-02-27 ASSESSMENT — PAIN - FUNCTIONAL ASSESSMENT: PAIN_FUNCTIONAL_ASSESSMENT: NONE - DENIES PAIN

## 2024-02-27 ASSESSMENT — ENCOUNTER SYMPTOMS
SINUS PAIN: 1
NAUSEA: 0
SINUS PRESSURE: 1
VOMITING: 0
SORE THROAT: 0
DIARRHEA: 0
ABDOMINAL PAIN: 0
SHORTNESS OF BREATH: 0
COUGH: 1
WHEEZING: 0

## 2024-02-27 NOTE — ED NOTES
PT GIVEN DISCHARGE INSTRUCTIONS, VERBALIZES UNDERSTANDING.  PT ASSESSMENT UNCHANGED, DISCHARGED IN STABLE CONDITION.        Joe Ortiz RN  02/27/24 0967

## 2024-02-27 NOTE — ED PROVIDER NOTES
[facility] URGENT CARE  UrgentCare Encounter      CHIEFCOMPLAINT       Chief Complaint   Patient presents with    Cough    Sinusitis    Fever       Nurses Notes reviewed and I agree except as noted in the HPI.  HISTORY OF PRESENT ILLNESS   Kush Reynolds is a 33 y.o. male who presents to the urgent care for evaluation.     Symptoms started Thursday/Friday, but fever started last night  Flu like symptoms.    The patient/patient representative has no other acute complaints at this time.    REVIEW OF SYSTEMS     Review of Systems   Constitutional:  Positive for fever. Negative for chills.   HENT:  Positive for congestion, sinus pressure and sinus pain. Negative for ear pain and sore throat.    Respiratory:  Positive for cough. Negative for shortness of breath and wheezing.    Cardiovascular:  Negative for chest pain and palpitations.   Gastrointestinal:  Negative for abdominal pain, diarrhea, nausea and vomiting.   Skin:  Negative for rash.   Allergic/Immunologic: Negative for environmental allergies and food allergies.   Neurological:  Negative for headaches.       PAST MEDICAL HISTORY         Diagnosis Date    Diabetes mellitus (HCC)        SURGICAL HISTORY     Patient  has a past surgical history that includes hernia repair; Dental surgery; and Tonsillectomy.    CURRENT MEDICATIONS       Previous Medications    ACETAMINOPHEN (TYLENOL) 325 MG TABLET    Take 2 tablets by mouth every 6 hours as needed for Pain    DEXTROMETHORPHAN-GUAIFENESIN (MUCINEX DM)  MG PER EXTENDED RELEASE TABLET    Take 1 tablet by mouth every 12 hours as needed for Congestion    FEXOFENADINE HCL (ALLEGRA ALLERGY PO)    Take by mouth    IBUPROFEN (IBU) 600 MG TABLET    Take 1 tablet by mouth every 6 hours as needed for Pain or Fever (1 p.o. 3 times daily with food for pain or fever)    INSULIN LISPRO (HUMALOG) 100 UNIT/ML SOLN INJECTION VIAL    Inject into the skin 3 times daily (with meals)    INSULIN REGULAR (HUMULIN

## 2024-02-27 NOTE — DISCHARGE INSTRUCTIONS
Suggestions for symptom management that are available over the counter.   If you have questions regarding allergies or contraindications to use, please speak to the pharmacy staff or your family provider.    For fevers or pain: acetaminophen (Tylenol), ibuprofen (Motrin)  For dry cough: medications containing dextromethorphan, such as Delsym, Robitussin DM or Mucinex DM and medicated throat lozenges  For congestion or sinus pressure: decongestant nasal sprays, such as Afrin (for up to 3 days), medications containing guaifenesin to help break up mucus, such as Mucinex or Robitussin, nasal steroid sprays, such as Flonase, Sensimist, Rhinocort or Nasonex and saline nasal sprays, neti pot or sinus rinse bottle  For runny nose, sneezing or watery/itchy eyes: less sedating antihistamines, such as loratidine (Claritin), fexofenadine (Allegra) or Cetirizine (Zyrtec) and antihistamine eye drops, such as ketotifen (Zaditor, Alaway) or olopatadine (Pataday)  For sore throat:  Chloraseptic throat spray or sore throat lozenges or  Mucinex Instasoothe Sore Throat & Pain Cherry Spray  If you have high blood pressure, a brand like Coricidin HBP may be an option.you should avoid medications containing pseudoephedrine or phenylephrine, such as Sudafed,  running a humidifier in your bedroom may be helpful for many of your symptoms.  If your cough is keeping you awake at night, you can try raising your head with an extra pillow.  If the skin around your nose and lips becomes sore, you can put some petroleum jelly on the area.      Suggestions for over-the-counter supplements to help your immune system, if you have questions regarding allergies or contraindications to use, please speak to the pharmacy staff or your family provider.    Multi-vitamin/multi-mineral daily   Vitamin D3 3000 IU daily  Zinc 30 mg daily  Vitamin C 1000 mg twice daily  B-100 complex as daily as directed on bottle  Probiotic/Prebiotic preston  Gargle with

## 2024-03-03 ENCOUNTER — HOSPITAL ENCOUNTER (EMERGENCY)
Age: 34
Discharge: HOME | End: 2024-03-03
Payer: COMMERCIAL

## 2024-03-03 VITALS
DIASTOLIC BLOOD PRESSURE: 73 MMHG | HEART RATE: 86 BPM | WEIGHT: 248 LBS | RESPIRATION RATE: 18 BRPM | OXYGEN SATURATION: 97 % | TEMPERATURE: 97.9 F | SYSTOLIC BLOOD PRESSURE: 104 MMHG | BODY MASS INDEX: 36.62 KG/M2

## 2024-03-03 DIAGNOSIS — H65.02 NON-RECURRENT ACUTE SEROUS OTITIS MEDIA OF LEFT EAR: Primary | ICD-10-CM

## 2024-03-03 LAB — S PYO AG THROAT QL: NEGATIVE

## 2024-03-03 PROCEDURE — 99213 OFFICE O/P EST LOW 20 MIN: CPT

## 2024-03-03 PROCEDURE — 87651 STREP A DNA AMP PROBE: CPT

## 2024-03-03 RX ORDER — AMOXICILLIN AND CLAVULANATE POTASSIUM 875; 125 MG/1; MG/1
1 TABLET, FILM COATED ORAL 2 TIMES DAILY
Qty: 14 TABLET | Refills: 0 | Status: SHIPPED | OUTPATIENT
Start: 2024-03-03 | End: 2024-03-10

## 2024-03-03 RX ORDER — INSULIN PMP CART,AUT,G6/7,CNTR
EACH SUBCUTANEOUS
COMMUNITY
Start: 2024-02-29

## 2024-03-03 NOTE — ED TRIAGE NOTES
Kush arrives to room with complaint of  diagnosed with influenza b on Tuesday now has ear pain  symptoms started 3 days ago.

## 2024-03-03 NOTE — ED PROVIDER NOTES
[facility] URGENT CARE  Urgent Care Encounter      CHIEF COMPLAINT       Chief Complaint   Patient presents with    Pharyngitis    Otalgia       Nurses Notes reviewed and I agree except as noted in the HPI.  HISTORY OF PRESENT ILLNESS   Kush Reynolds is a 33 y.o. male who presents to urgent care with complaints of left ear pain and sore throat.  Patient reports he was diagnosed with influenza B 6 days ago.  Patient reports he began having throbbing left ear pain 3 days ago.  Patient reports he has been taking Tylenol, ibuprofen, Allegra-D, Mucinex over-the-counter for symptoms.Patient denies headache, dizziness, emesis.    REVIEW OF SYSTEMS     Review of Systems   Constitutional:  Negative for fatigue and fever.   HENT:  Positive for ear pain and sore throat.    Respiratory:  Negative for shortness of breath and wheezing.    Cardiovascular:  Negative for chest pain.   Gastrointestinal:  Negative for abdominal pain, diarrhea and vomiting.   Musculoskeletal:  Negative for arthralgias.   Neurological:  Negative for seizures.       PAST MEDICAL HISTORY         Diagnosis Date    Diabetes mellitus (HCC)        SURGICAL HISTORY     Patient  has a past surgical history that includes hernia repair; Dental surgery; and Tonsillectomy.    CURRENT MEDICATIONS       Previous Medications    ACETAMINOPHEN (TYLENOL) 325 MG TABLET    Take 2 tablets by mouth every 6 hours as needed for Pain    DEXTROMETHORPHAN-GUAIFENESIN (MUCINEX DM)  MG PER EXTENDED RELEASE TABLET    Take 1 tablet by mouth every 12 hours as needed for Congestion    FEXOFENADINE HCL (ALLEGRA ALLERGY PO)    Take by mouth    IBUPROFEN (IBU) 600 MG TABLET    Take 1 tablet by mouth every 6 hours as needed for Pain or Fever (1 p.o. 3 times daily with food for pain or fever)    INSULIN DISPOSABLE PUMP (OMNIPOD 5 G6 PODS, GEN 5,) MISC        INSULIN LISPRO (HUMALOG) 100 UNIT/ML SOLN INJECTION VIAL    Inject into the skin 3 times daily (with meals)

## 2024-03-23 ENCOUNTER — HOSPITAL ENCOUNTER (EMERGENCY)
Age: 34
Discharge: HOME | End: 2024-03-23
Payer: COMMERCIAL

## 2024-03-23 VITALS
SYSTOLIC BLOOD PRESSURE: 124 MMHG | HEART RATE: 77 BPM | OXYGEN SATURATION: 97 % | DIASTOLIC BLOOD PRESSURE: 74 MMHG | RESPIRATION RATE: 18 BRPM | HEIGHT: 69 IN | TEMPERATURE: 97.9 F | WEIGHT: 240 LBS | BODY MASS INDEX: 35.55 KG/M2

## 2024-03-23 DIAGNOSIS — H66.002 NON-RECURRENT ACUTE SUPPURATIVE OTITIS MEDIA OF LEFT EAR WITHOUT SPONTANEOUS RUPTURE OF TYMPANIC MEMBRANE: Primary | ICD-10-CM

## 2024-03-23 PROCEDURE — 99213 OFFICE O/P EST LOW 20 MIN: CPT

## 2024-03-23 RX ORDER — AMOXICILLIN AND CLAVULANATE POTASSIUM 875; 125 MG/1; MG/1
1 TABLET, FILM COATED ORAL 2 TIMES DAILY
Qty: 14 TABLET | Refills: 0 | Status: SHIPPED | OUTPATIENT
Start: 2024-03-23 | End: 2024-03-30

## 2024-03-23 ASSESSMENT — ENCOUNTER SYMPTOMS
COUGH: 0
VOMITING: 0
WHEEZING: 0
ABDOMINAL PAIN: 0
SHORTNESS OF BREATH: 0
DIARRHEA: 0

## 2024-03-23 NOTE — ED PROVIDER NOTES
[facility] URGENT CARE  Urgent Care Encounter      CHIEF COMPLAINT       Chief Complaint   Patient presents with    Otalgia       Nurses Notes reviewed and I agree except as noted in the HPI.  HISTORY OF PRESENT ILLNESS   Kush Reynolds is a 33 y.o. male who presents to urgent care with complaints of left-sided ear pain.  Patient reports he was diagnosed with ear infection 3 weeks ago and did take an antibiotic.  Patient reports he was out of state recently and did return from a trip 2 days ago.  Patient reports when he returned from the trip he developed severe left ear pain and now pain is persistent.  Patient reports he has been taking ibuprofen, Allegra-D, Flonase without relief.    REVIEW OF SYSTEMS     Review of Systems   Constitutional:  Negative for fatigue and fever.   HENT:  Positive for ear pain. Negative for congestion.    Respiratory:  Negative for cough, shortness of breath and wheezing.    Cardiovascular:  Negative for chest pain.   Gastrointestinal:  Negative for abdominal pain, diarrhea and vomiting.   Musculoskeletal:  Negative for arthralgias.   Neurological:  Negative for seizures and numbness.       PAST MEDICAL HISTORY         Diagnosis Date    Diabetes mellitus (HCC)        SURGICAL HISTORY     Patient  has a past surgical history that includes hernia repair; Dental surgery; and Tonsillectomy.    CURRENT MEDICATIONS       Discharge Medication List as of 3/23/2024 12:54 PM        CONTINUE these medications which have NOT CHANGED    Details   Insulin Disposable Pump (OMNIPOD 5 G6 PODS, GEN 5,) MISC Historical Med      insulin lispro (HUMALOG) 100 UNIT/ML SOLN injection vial Inject into the skin 3 times daily (with meals)Historical Med      dextromethorphan-guaiFENesin (MUCINEX DM)  MG per extended release tablet Take 1 tablet by mouth every 12 hours as needed for CongestionHistorical Med      Fexofenadine HCl (ALLEGRA ALLERGY PO) Take by mouthHistorical Med      acetaminophen

## 2024-03-23 NOTE — ED TRIAGE NOTES
Patient reports having ear infection 2-3 weeks ago and was placed on an antibiotic, which he finished recently.  Monday he started with ear pain.  He was on a trip out of state from Monday-Thursday evening.  He states he can no longer hear out of the left ear and continues to have pain.

## 2025-07-01 ENCOUNTER — OFFICE VISIT (OUTPATIENT)
Age: 35
End: 2025-07-01

## 2025-07-01 VITALS
RESPIRATION RATE: 18 BRPM | DIASTOLIC BLOOD PRESSURE: 80 MMHG | HEART RATE: 94 BPM | BODY MASS INDEX: 35.55 KG/M2 | HEIGHT: 69 IN | SYSTOLIC BLOOD PRESSURE: 125 MMHG | OXYGEN SATURATION: 95 % | WEIGHT: 240 LBS

## 2025-07-01 DIAGNOSIS — R03.0 ELEVATED BLOOD PRESSURE READING: Primary | ICD-10-CM

## 2025-07-01 DIAGNOSIS — S80.212A ABRASION OF LEFT KNEE, INITIAL ENCOUNTER: ICD-10-CM

## 2025-07-01 RX ORDER — MUPIROCIN 2 %
OINTMENT (GRAM) TOPICAL
Qty: 22 G | Refills: 1 | Status: SHIPPED | OUTPATIENT
Start: 2025-07-01

## 2025-07-01 ASSESSMENT — ENCOUNTER SYMPTOMS
SORE THROAT: 0
RESPIRATORY NEGATIVE: 1
ALLERGIC/IMMUNOLOGIC NEGATIVE: 1
RHINORRHEA: 0
ABDOMINAL PAIN: 0
EYES NEGATIVE: 1

## 2025-07-01 NOTE — PROGRESS NOTES
Kush Reynolds (: 1990) is a 35 y.o. male, New patient, here for evaluation of the following complaint(s): Knee Pain (Scratched knee a week ago, has had a Tdap in last year)        History provided by:  Patient  Knee Pain   The incident occurred 5 to 7 days ago. The incident occurred at home. Injury mechanism: abrasion. The pain is present in the left knee. The pain is at a severity of 3/10. The pain is mild. The pain has been Improving since onset. Pertinent negatives include no inability to bear weight, loss of motion, loss of sensation or muscle weakness. He reports no foreign bodies present. The symptoms are aggravated by palpation. Treatments tried: MAURY daily.        PAST MEDICAL HISTORY    Past Medical History:   Diagnosis Date    Diabetes mellitus (HCC)        SURGICAL HISTORY    Past Surgical History:   Procedure Laterality Date    DENTAL SURGERY      HERNIA REPAIR      TONSILLECTOMY         CURRENT MEDICATIONS    Current Outpatient Rx   Medication Sig Dispense Refill    Insulin Disposable Pump (OMNIPOD 5 G6 PODS, GEN 5,) MISC       insulin lispro (HUMALOG) 100 UNIT/ML SOLN injection vial Inject into the skin 3 times daily (with meals)      Fexofenadine HCl (ALLEGRA ALLERGY PO) Take by mouth      acetaminophen (TYLENOL) 325 MG tablet Take 2 tablets by mouth every 6 hours as needed for Pain      insulin regular (HUMULIN R;NOVOLIN R) 100 UNIT/ML injection Inject into the skin See Admin Instructions      ibuprofen (IBU) 600 MG tablet Take 1 tablet by mouth every 6 hours as needed for Pain or Fever (1 p.o. 3 times daily with food for pain or fever) 15 tablet 0    dextromethorphan-guaiFENesin (MUCINEX DM)  MG per extended release tablet Take 1 tablet by mouth every 12 hours as needed for Congestion (Patient not taking: Reported on 2025)         ALLERGIES    No Known Allergies    FAMILY HISTORY    Family History   Problem Relation Age of Onset    No Known Problems Mother     No Known Problems